# Patient Record
Sex: MALE | Race: WHITE | NOT HISPANIC OR LATINO | Employment: OTHER | URBAN - METROPOLITAN AREA
[De-identification: names, ages, dates, MRNs, and addresses within clinical notes are randomized per-mention and may not be internally consistent; named-entity substitution may affect disease eponyms.]

---

## 2019-08-12 ENCOUNTER — LAB (OUTPATIENT)
Dept: LAB | Facility: HOSPITAL | Age: 49
End: 2019-08-12

## 2019-08-12 ENCOUNTER — TRANSCRIBE ORDERS (OUTPATIENT)
Dept: ADMINISTRATIVE | Facility: HOSPITAL | Age: 49
End: 2019-08-12

## 2019-08-12 DIAGNOSIS — N42.9 DISORDER OF PROSTATE: ICD-10-CM

## 2019-08-12 DIAGNOSIS — I82.401 DVT, RECURRENT, LOWER EXTREMITY, ACUTE, RIGHT (HCC): Primary | ICD-10-CM

## 2019-08-12 DIAGNOSIS — I10 ESSENTIAL (PRIMARY) HYPERTENSION: ICD-10-CM

## 2019-08-12 LAB
APTT PPP: 42 SECONDS (ref 22.7–35.4)
CHROMATIN AB SERPL-ACNC: <10 IU/ML (ref 0–14)
ERYTHROCYTE [SEDIMENTATION RATE] IN BLOOD: 33 MM/HR (ref 0–15)
FACTOR II, DNA ANALYSIS: NORMAL
FIBRINOGEN PPP-MCNC: 457 MG/DL (ref 219–464)
HCYS SERPL-MCNC: 16.1 UMOL/L (ref 0–15)
INR PPP: 1.77 (ref 0.9–1.1)
PROTHROMBIN TIME: 20.3 SECONDS (ref 11.7–14.2)
PSA SERPL-MCNC: 0.56 NG/ML (ref 0–4)

## 2019-08-12 PROCEDURE — 85306 CLOT INHIBIT PROT S FREE: CPT

## 2019-08-12 PROCEDURE — 85705 THROMBOPLASTIN INHIBITION: CPT

## 2019-08-12 PROCEDURE — 86431 RHEUMATOID FACTOR QUANT: CPT

## 2019-08-12 PROCEDURE — 85652 RBC SED RATE AUTOMATED: CPT

## 2019-08-12 PROCEDURE — 83090 ASSAY OF HOMOCYSTEINE: CPT

## 2019-08-12 PROCEDURE — 85303 CLOT INHIBIT PROT C ACTIVITY: CPT

## 2019-08-12 PROCEDURE — 85732 THROMBOPLASTIN TIME PARTIAL: CPT

## 2019-08-12 PROCEDURE — 85613 RUSSELL VIPER VENOM DILUTED: CPT

## 2019-08-12 PROCEDURE — 36415 COLL VENOUS BLD VENIPUNCTURE: CPT | Performed by: NURSE PRACTITIONER

## 2019-08-12 PROCEDURE — 86038 ANTINUCLEAR ANTIBODIES: CPT

## 2019-08-12 PROCEDURE — 85307 ASSAY ACTIVATED PROTEIN C: CPT

## 2019-08-12 PROCEDURE — 85384 FIBRINOGEN ACTIVITY: CPT

## 2019-08-12 PROCEDURE — 81240 F2 GENE: CPT

## 2019-08-12 PROCEDURE — 85300 ANTITHROMBIN III ACTIVITY: CPT

## 2019-08-12 PROCEDURE — 86147 CARDIOLIPIN ANTIBODY EA IG: CPT

## 2019-08-12 PROCEDURE — 84153 ASSAY OF PSA TOTAL: CPT

## 2019-08-12 PROCEDURE — 85610 PROTHROMBIN TIME: CPT | Performed by: NURSE PRACTITIONER

## 2019-08-12 PROCEDURE — 85598 HEXAGNAL PHOSPH PLTLT NEUTRL: CPT

## 2019-08-12 PROCEDURE — 85670 THROMBIN TIME PLASMA: CPT

## 2019-08-12 PROCEDURE — 86148 ANTI-PHOSPHOLIPID ANTIBODY: CPT

## 2019-08-12 PROCEDURE — 85730 THROMBOPLASTIN TIME PARTIAL: CPT | Performed by: NURSE PRACTITIONER

## 2019-08-13 LAB
AT III PPP CHRO-ACNC: 99 % (ref 90–134)
PROT C ACT/NOR PPP: 27 % (ref 86–163)
PROT S ACT/NOR PPP: 70 % (ref 70–127)
PROT S FREE PPP-ACNC: 58 % (ref 49–138)

## 2019-08-14 ENCOUNTER — HOSPITAL ENCOUNTER (OUTPATIENT)
Dept: CT IMAGING | Facility: HOSPITAL | Age: 49
Discharge: HOME | End: 2019-08-14
Admitting: SURGERY

## 2019-08-14 DIAGNOSIS — I82.401 DVT, RECURRENT, LOWER EXTREMITY, ACUTE, RIGHT (HCC): ICD-10-CM

## 2019-08-14 LAB
ANA SER QL: NEGATIVE
APCR PPP: 2.5 RATIO (ref 2.2–3.5)
CARDIOLIPIN IGA SER IA-ACNC: <9 APL U/ML (ref 0–11)
CARDIOLIPIN IGG SER IA-ACNC: 10 GPL U/ML (ref 0–14)
CARDIOLIPIN IGM SER IA-ACNC: <9 MPL U/ML (ref 0–12)

## 2019-08-14 PROCEDURE — 82565 ASSAY OF CREATININE: CPT

## 2019-08-14 PROCEDURE — 74174 CTA ABD&PLVS W/CONTRAST: CPT

## 2019-08-14 PROCEDURE — 0 IOPAMIDOL PER 1 ML: Performed by: SURGERY

## 2019-08-14 RX ADMIN — IOPAMIDOL 95 ML: 755 INJECTION, SOLUTION INTRAVENOUS at 21:26

## 2019-08-15 LAB
APTT HEX PL PPP: 5 SEC (ref 0–11)
CREAT BLDA-MCNC: 1.2 MG/DL (ref 0.6–1.3)
DRVVT IMM 1:2 NP PPP: 38.6 SEC (ref 0–47)
INCUBATED PTT LA MIX: 50.2 SEC (ref 0–48.9)
LA NT DPL PPP: 67.9 SEC (ref 0–55)
LA NT DPL/LA NT HPL PPP-RTO: 0.94 RATIO (ref 0–1.4)
LA NT PLATELET PPP: 53.4 SEC (ref 0–51.9)
LUPUS ANTICOAGULANT REFLEX: ABNORMAL
PTT LA MIX: 46.7 SEC (ref 0–48.9)
SCREEN DRVVT: 51 SEC (ref 0–47)
THROMBIN TIME: 18 SEC (ref 0–23)

## 2019-08-16 ENCOUNTER — OFFICE VISIT (OUTPATIENT)
Dept: CARDIOLOGY | Facility: CLINIC | Age: 49
End: 2019-08-16

## 2019-08-16 VITALS
WEIGHT: 315 LBS | SYSTOLIC BLOOD PRESSURE: 130 MMHG | HEIGHT: 77 IN | BODY MASS INDEX: 37.19 KG/M2 | HEART RATE: 63 BPM | DIASTOLIC BLOOD PRESSURE: 80 MMHG

## 2019-08-16 DIAGNOSIS — R40.0 DAYTIME SOMNOLENCE: ICD-10-CM

## 2019-08-16 DIAGNOSIS — R06.09 EXERTIONAL DYSPNEA: ICD-10-CM

## 2019-08-16 DIAGNOSIS — R07.89 ATYPICAL CHEST PAIN: Primary | ICD-10-CM

## 2019-08-16 DIAGNOSIS — G47.10 HYPERSOMNIA: ICD-10-CM

## 2019-08-16 DIAGNOSIS — I34.1 MVP (MITRAL VALVE PROLAPSE): ICD-10-CM

## 2019-08-16 DIAGNOSIS — I10 ESSENTIAL HYPERTENSION: ICD-10-CM

## 2019-08-16 DIAGNOSIS — I89.0 LYMPHEDEMA: ICD-10-CM

## 2019-08-16 PROCEDURE — 93000 ELECTROCARDIOGRAM COMPLETE: CPT | Performed by: INTERNAL MEDICINE

## 2019-08-16 PROCEDURE — 99204 OFFICE O/P NEW MOD 45 MIN: CPT | Performed by: INTERNAL MEDICINE

## 2019-08-16 RX ORDER — WARFARIN SODIUM 5 MG/1
TABLET ORAL
COMMUNITY

## 2019-08-16 RX ORDER — CHLORAL HYDRATE 500 MG
CAPSULE ORAL
COMMUNITY

## 2019-08-16 RX ORDER — AMLODIPINE BESYLATE 5 MG/1
5 TABLET ORAL DAILY
COMMUNITY

## 2019-08-16 RX ORDER — LANOLIN ALCOHOL/MO/W.PET/CERES
50 CREAM (GRAM) TOPICAL DAILY
COMMUNITY

## 2019-08-16 RX ORDER — MULTIPLE VITAMINS W/ MINERALS TAB 9MG-400MCG
1 TAB ORAL DAILY
COMMUNITY

## 2019-08-16 NOTE — PROGRESS NOTES
[facility]      Patient Name: Josr Rayo Jr.  :1970  Age: 49 y.o.  Encounter Provider:  Jayro Tineo Jr, MD      Chief Complaint:   Chief Complaint   Patient presents with   • Chest Pain   • Hypertension         HPI  Josr Rayo Jr. is a 49 y.o. male past medical history of mitral valve prolapse diagnosed in childhood, severe bilateral lymphedema lower extremities, hypertension who presents for initial evaluation of chest pain.  The pain he describes wakes him from sleep it is sharp in nature and located in the anterior axillary line.  Denies any radiation of the pain and he has no pain with exertion although he is fairly sedentary due to the pain associated with his lower extremity swelling.  He is being evaluated by Dr. Caraballo for sclerotherapy of lower extremity varicosities.  He notes mild chronic exertional dyspnea.  He is being treated for hypertension currently on amlodipine.  He denies orthopnea or PND.  He notes significant snoring and daytime somnolence.  He denies any diagnosis of congenital disorder although he has mild features like frontal bossing which seem consistent with endocrine/metabolic disorder.  No reported family history of premature coronary artery disease.  He denies tobacco alcohol or illicit drug use.      The following portions of the patient's history were reviewed and updated as appropriate: allergies, current medications, past family history, past medical history, past social history, past surgical history and problem list.      Review of Systems   Constitution: Negative for chills and fever.   HENT: Negative for hoarse voice and sore throat.    Eyes: Negative for double vision and photophobia.   Cardiovascular: Positive for chest pain and leg swelling. Negative for near-syncope, orthopnea, palpitations, paroxysmal nocturnal dyspnea and syncope.   Respiratory: Negative for cough and wheezing.    Skin: Negative for poor wound healing and rash.  "  Musculoskeletal: Positive for arthritis. Negative for joint swelling.   Gastrointestinal: Negative for bloating, abdominal pain, hematemesis and hematochezia.   Neurological: Negative for dizziness and focal weakness.   Psychiatric/Behavioral: Negative for depression and suicidal ideas.       OBJECTIVE:   Vital Signs  Vitals:    08/16/19 0920   BP: 130/80   Pulse: 63     Estimated body mass index is 47.2 kg/m² as calculated from the following:    Height as of this encounter: 195.6 cm (77\").    Weight as of this encounter: 181 kg (398 lb).    Physical Exam   Constitutional: He is oriented to person, place, and time. He appears well-nourished. No distress.   HENT:   Head: Atraumatic.   Mild frontal bossing   Eyes: Conjunctivae are normal. Pupils are equal, round, and reactive to light.   Neck: No JVD present. No thyromegaly present.   Cardiovascular: Exam reveals no gallop and no friction rub.   No murmur heard.  Midsystolic click noted   Pulmonary/Chest: No respiratory distress. He exhibits no tenderness.   Abdominal: Bowel sounds are normal. He exhibits no distension.   Musculoskeletal: He exhibits edema. He exhibits no tenderness.   4+ edema to the knees bilaterally severe lymphedema   Neurological: He is alert and oriented to person, place, and time.   Skin: No rash noted. There is erythema.   Erythema of bilateral lower extremity with severe skin folds secondary to severe lymphedema   Psychiatric: He has a normal mood and affect. Judgment normal.   Vitals reviewed.        ECG 12 Lead  Date/Time: 8/16/2019 11:22 AM  Performed by: Jayro Tineo Jr., MD  Authorized by: Jayro Tineo Jr., MD   Comparison: not compared with previous ECG   Rhythm: sinus rhythm  Q waves: III and aVF    Other findings: non-specific ST-T wave changes    Clinical impression: abnormal EKG            Cardiac Procedures:  1.         ASSESSMENT:      Diagnosis Plan   1. Atypical chest pain     2. Exertional dyspnea     3. MVP (mitral valve " prolapse)     4. Essential hypertension     5. Lymphedema     6. Daytime somnolence           PLAN OF CARE:     1. Chest pain -atypical characteristics but in the setting of hypertension and abnormal EKG will require screening study.  Will order PET stress due to the patient's body habitus.  Chest pain may also be related to valvular anomaly.  Will check echocardiogram.  2. Mitral valve prolapse -check echocardiogram.  3. Hypertension -fair control but amlodipine may not be the best choice given this patient's severe lower extremity edema.  I have encouraged him to speak with Dr. Gilliland about the possibility of switching agents.  4. Daytimes somnolence -Home sleep study  5. Lymphedema -consideration being given to sclerotherapy.  Currently on warfarin for chronic DVT.  If stress test is negative then he is low risk for low risk procedure/surgery.    Return to clinic 3 months           Discharge Medications           Accurate as of 8/16/19 11:17 AM. If you have any questions, ask your nurse or doctor.               Continue These Medications      Instructions Start Date   AMINO ACID PO   Oral, As Needed      amLODIPine 5 MG tablet  Commonly known as:  NORVASC   5 mg, Oral, Daily      fish oil 1000 MG capsule capsule   Oral, Daily With Breakfast      FISH OIL PO   500 mg, Oral, Take one tablet by mouth twice daily       multivitamin with minerals tablet tablet   1 tablet, Oral, Daily      vitamin B-6 50 MG tablet  Commonly known as:  PYRIDOXINE   50 mg, Oral, Daily      warfarin 5 MG tablet  Commonly known as:  COUMADIN   Take one tablet daily alternating with 7.5 mg tablets              Thank you for allowing me to participate in the care of your patient,      Sincerely,   Jayro Tineo Jr, MD  [facility]  08/16/19  11:17 AM    **Jose Disclaimer:**  Much of this encounter note is an electronic transcription/translation of spoken language to printed text. The electronic translation of spoken language  may permit erroneous, or at times, nonsensical words or phrases to be inadvertently transcribed. Although I have reviewed the note for such errors, some may still exist.  \

## 2019-08-18 LAB
PS IGA SER-ACNC: 4 APS IGA (ref 0–20)
PS IGG SER-ACNC: 5 GPS IGG (ref 0–11)
PS IGM SER-ACNC: 5 MPS IGM (ref 0–25)

## 2019-08-30 ENCOUNTER — HOSPITAL ENCOUNTER (OUTPATIENT)
Dept: CARDIOLOGY | Facility: HOSPITAL | Age: 49
Discharge: HOME | End: 2019-08-30
Admitting: INTERNAL MEDICINE

## 2019-08-30 ENCOUNTER — HOSPITAL ENCOUNTER (OUTPATIENT)
Dept: CARDIOLOGY | Facility: HOSPITAL | Age: 49
Discharge: HOME | End: 2019-08-30

## 2019-08-30 VITALS
HEIGHT: 77 IN | OXYGEN SATURATION: 97 % | BODY MASS INDEX: 37.19 KG/M2 | DIASTOLIC BLOOD PRESSURE: 84 MMHG | SYSTOLIC BLOOD PRESSURE: 118 MMHG | HEART RATE: 93 BPM | WEIGHT: 315 LBS

## 2019-08-30 VITALS — WEIGHT: 315 LBS | HEIGHT: 77 IN | BODY MASS INDEX: 37.19 KG/M2

## 2019-08-30 DIAGNOSIS — R07.89 ATYPICAL CHEST PAIN: ICD-10-CM

## 2019-08-30 DIAGNOSIS — I34.1 MVP (MITRAL VALVE PROLAPSE): ICD-10-CM

## 2019-08-30 LAB
BH CV NUCLEAR PRIOR STUDY: 3
BH CV STRESS BP STAGE 1: NORMAL
BH CV STRESS COMMENTS STAGE 1: NORMAL
BH CV STRESS DOSE REGADENOSON STAGE 1: 0.4
BH CV STRESS DURATION MIN STAGE 1: 0
BH CV STRESS DURATION SEC STAGE 1: 10
BH CV STRESS HR STAGE 1: 97
BH CV STRESS PROTOCOL 1: NORMAL
BH CV STRESS RECOVERY BP: NORMAL MMHG
BH CV STRESS RECOVERY HR: 66 BPM
BH CV STRESS STAGE 1: 1
LV EF NUC BP: 65 %
MAXIMAL PREDICTED HEART RATE: 171 BPM
PERCENT MAX PREDICTED HR: 56.73 %
STRESS BASELINE BP: NORMAL MMHG
STRESS BASELINE HR: 58 BPM
STRESS PERCENT HR: 67 %
STRESS POST EXERCISE DUR SEC: 10 SEC
STRESS POST PEAK BP: NORMAL MMHG
STRESS POST PEAK HR: 97 BPM
STRESS TARGET HR: 145 BPM

## 2019-08-30 PROCEDURE — 0 RUBIDIUM CHLORIDE: Performed by: INTERNAL MEDICINE

## 2019-08-30 PROCEDURE — 93018 CV STRESS TEST I&R ONLY: CPT | Performed by: INTERNAL MEDICINE

## 2019-08-30 PROCEDURE — 93306 TTE W/DOPPLER COMPLETE: CPT

## 2019-08-30 PROCEDURE — 93017 CV STRESS TEST TRACING ONLY: CPT

## 2019-08-30 PROCEDURE — 93016 CV STRESS TEST SUPVJ ONLY: CPT | Performed by: INTERNAL MEDICINE

## 2019-08-30 PROCEDURE — 78492 MYOCRD IMG PET MLT RST&STRS: CPT | Performed by: INTERNAL MEDICINE

## 2019-08-30 PROCEDURE — 78492 MYOCRD IMG PET MLT RST&STRS: CPT

## 2019-08-30 PROCEDURE — A9555 RB82 RUBIDIUM: HCPCS | Performed by: INTERNAL MEDICINE

## 2019-08-30 PROCEDURE — 25010000002 PERFLUTREN (DEFINITY) 8.476 MG IN SODIUM CHLORIDE 0.9 % 10 ML INJECTION: Performed by: INTERNAL MEDICINE

## 2019-08-30 PROCEDURE — 93306 TTE W/DOPPLER COMPLETE: CPT | Performed by: INTERNAL MEDICINE

## 2019-08-30 PROCEDURE — 25010000002 REGADENOSON 0.4 MG/5ML SOLUTION: Performed by: INTERNAL MEDICINE

## 2019-08-30 RX ADMIN — PERFLUTREN 1.5 ML: 6.52 INJECTION, SUSPENSION INTRAVENOUS at 13:11

## 2019-08-30 RX ADMIN — REGADENOSON 0.4 MG: 0.08 INJECTION, SOLUTION INTRAVENOUS at 13:49

## 2019-08-30 RX ADMIN — RUBIDIUM CHLORIDE RB-82 1 DOSE: 150 INJECTION, SOLUTION INTRAVENOUS at 13:39

## 2019-08-30 RX ADMIN — RUBIDIUM CHLORIDE RB-82 1 DOSE: 150 INJECTION, SOLUTION INTRAVENOUS at 13:49

## 2019-09-03 ENCOUNTER — TELEPHONE (OUTPATIENT)
Dept: CARDIOLOGY | Facility: CLINIC | Age: 49
End: 2019-09-03

## 2019-09-03 LAB
AORTIC ARCH: 3 CM
AORTIC ROOT ANNULUS: 2.4 CM
ASCENDING AORTA: 3.9 CM
BH CV ECHO MEAS - ACS: 2.6 CM
BH CV ECHO MEAS - AO MAX PG (FULL): 3.4 MMHG
BH CV ECHO MEAS - AO MAX PG: 7.1 MMHG
BH CV ECHO MEAS - AO MEAN PG (FULL): 2 MMHG
BH CV ECHO MEAS - AO MEAN PG: 4.3 MMHG
BH CV ECHO MEAS - AO V2 MAX: 133 CM/SEC
BH CV ECHO MEAS - AO V2 MEAN: 98.7 CM/SEC
BH CV ECHO MEAS - AO V2 VTI: 30.6 CM
BH CV ECHO MEAS - AVA(I,A): 3.6 CM^2
BH CV ECHO MEAS - AVA(I,D): 3.6 CM^2
BH CV ECHO MEAS - AVA(V,A): 3.4 CM^2
BH CV ECHO MEAS - AVA(V,D): 3.4 CM^2
BH CV ECHO MEAS - BSA(HAYCOCK): 3.2 M^2
BH CV ECHO MEAS - BSA: 3 M^2
BH CV ECHO MEAS - BZI_BMI: 47.2 KILOGRAMS/M^2
BH CV ECHO MEAS - BZI_METRIC_HEIGHT: 195.6 CM
BH CV ECHO MEAS - BZI_METRIC_WEIGHT: 180.5 KG
BH CV ECHO MEAS - EDV(MOD-SP2): 172 ML
BH CV ECHO MEAS - EDV(MOD-SP4): 180 ML
BH CV ECHO MEAS - EDV(TEICH): 146.5 ML
BH CV ECHO MEAS - EF(CUBED): 66.1 %
BH CV ECHO MEAS - EF(MOD-BP): 59 %
BH CV ECHO MEAS - EF(MOD-SP2): 59.3 %
BH CV ECHO MEAS - EF(MOD-SP4): 58.3 %
BH CV ECHO MEAS - EF(TEICH): 57 %
BH CV ECHO MEAS - ESV(MOD-SP2): 70 ML
BH CV ECHO MEAS - ESV(MOD-SP4): 75 ML
BH CV ECHO MEAS - ESV(TEICH): 63 ML
BH CV ECHO MEAS - FS: 30.3 %
BH CV ECHO MEAS - IVS/LVPW: 0.93
BH CV ECHO MEAS - IVSD: 1.2 CM
BH CV ECHO MEAS - LAT PEAK E' VEL: 12 CM/SEC
BH CV ECHO MEAS - LV DIASTOLIC VOL/BSA (35-75): 60.1 ML/M^2
BH CV ECHO MEAS - LV MASS(C)D: 270.7 GRAMS
BH CV ECHO MEAS - LV MASS(C)DI: 90.3 GRAMS/M^2
BH CV ECHO MEAS - LV MAX PG: 3.7 MMHG
BH CV ECHO MEAS - LV MEAN PG: 2.4 MMHG
BH CV ECHO MEAS - LV SYSTOLIC VOL/BSA (12-30): 25 ML/M^2
BH CV ECHO MEAS - LV V1 MAX: 95.6 CM/SEC
BH CV ECHO MEAS - LV V1 MEAN: 74.4 CM/SEC
BH CV ECHO MEAS - LV V1 VTI: 23.6 CM
BH CV ECHO MEAS - LVIDD: 5.5 CM
BH CV ECHO MEAS - LVIDS: 3.8 CM
BH CV ECHO MEAS - LVLD AP2: 9.1 CM
BH CV ECHO MEAS - LVLD AP4: 9.4 CM
BH CV ECHO MEAS - LVLS AP2: 7.9 CM
BH CV ECHO MEAS - LVLS AP4: 7.7 CM
BH CV ECHO MEAS - LVOT AREA (M): 4.5 CM^2
BH CV ECHO MEAS - LVOT AREA: 4.7 CM^2
BH CV ECHO MEAS - LVOT DIAM: 2.4 CM
BH CV ECHO MEAS - LVPWD: 1.2 CM
BH CV ECHO MEAS - MED PEAK E' VEL: 7 CM/SEC
BH CV ECHO MEAS - MV A DUR: 0.17 SEC
BH CV ECHO MEAS - MV A MAX VEL: 64 CM/SEC
BH CV ECHO MEAS - MV DEC SLOPE: 260.9 CM/SEC^2
BH CV ECHO MEAS - MV DEC TIME: 0.3 SEC
BH CV ECHO MEAS - MV E MAX VEL: 77.6 CM/SEC
BH CV ECHO MEAS - MV E/A: 1.2
BH CV ECHO MEAS - MV MAX PG: 2.1 MMHG
BH CV ECHO MEAS - MV MEAN PG: 1.2 MMHG
BH CV ECHO MEAS - MV P1/2T MAX VEL: 79.1 CM/SEC
BH CV ECHO MEAS - MV P1/2T: 88.7 MSEC
BH CV ECHO MEAS - MV V2 MAX: 71.9 CM/SEC
BH CV ECHO MEAS - MV V2 MEAN: 52.7 CM/SEC
BH CV ECHO MEAS - MV V2 VTI: 30.8 CM
BH CV ECHO MEAS - MVA P1/2T LCG: 2.8 CM^2
BH CV ECHO MEAS - MVA(P1/2T): 2.5 CM^2
BH CV ECHO MEAS - MVA(VTI): 3.6 CM^2
BH CV ECHO MEAS - PA ACC TIME: 0.12 SEC
BH CV ECHO MEAS - PA MAX PG (FULL): 2 MMHG
BH CV ECHO MEAS - PA MAX PG: 4.5 MMHG
BH CV ECHO MEAS - PA PR(ACCEL): 25.1 MMHG
BH CV ECHO MEAS - PA V2 MAX: 105.9 CM/SEC
BH CV ECHO MEAS - PULM A REVS DUR: 0.13 SEC
BH CV ECHO MEAS - PULM A REVS VEL: 28.8 CM/SEC
BH CV ECHO MEAS - PULM DIAS VEL: 50.2 CM/SEC
BH CV ECHO MEAS - PULM S/D: 1
BH CV ECHO MEAS - PULM SYS VEL: 51 CM/SEC
BH CV ECHO MEAS - PVA(V,A): 4.3 CM^2
BH CV ECHO MEAS - PVA(V,D): 4.3 CM^2
BH CV ECHO MEAS - QP/QS: 0.94
BH CV ECHO MEAS - RAP SYSTOLE: 15 MMHG
BH CV ECHO MEAS - RV MAX PG: 2.5 MMHG
BH CV ECHO MEAS - RV MEAN PG: 1.5 MMHG
BH CV ECHO MEAS - RV V1 MAX: 78.6 CM/SEC
BH CV ECHO MEAS - RV V1 MEAN: 60.1 CM/SEC
BH CV ECHO MEAS - RV V1 VTI: 18 CM
BH CV ECHO MEAS - RVOT AREA: 5.8 CM^2
BH CV ECHO MEAS - RVOT DIAM: 2.7 CM
BH CV ECHO MEAS - RVSP: 38.1 MMHG
BH CV ECHO MEAS - SI(CUBED): 36.4 ML/M^2
BH CV ECHO MEAS - SI(LVOT): 36.8 ML/M^2
BH CV ECHO MEAS - SI(MOD-SP2): 34 ML/M^2
BH CV ECHO MEAS - SI(MOD-SP4): 35 ML/M^2
BH CV ECHO MEAS - SI(TEICH): 27.9 ML/M^2
BH CV ECHO MEAS - SUP REN AO DIAM: 2.6 CM
BH CV ECHO MEAS - SV(CUBED): 109 ML
BH CV ECHO MEAS - SV(LVOT): 110.4 ML
BH CV ECHO MEAS - SV(MOD-SP2): 102 ML
BH CV ECHO MEAS - SV(MOD-SP4): 105 ML
BH CV ECHO MEAS - SV(RVOT): 104.1 ML
BH CV ECHO MEAS - SV(TEICH): 83.5 ML
BH CV ECHO MEAS - TAPSE (>1.6): 3.3 CM2
BH CV ECHO MEAS - TR MAX VEL: 240.3 CM/SEC
BH CV ECHO MEASUREMENTS AVERAGE E/E' RATIO: 8.17
BH CV XLRA - RV BASE: 4.1 CM
BH CV XLRA - TDI S': 16 CM/SEC
LEFT ATRIUM VOLUME INDEX: 44 ML/M2
MAXIMAL PREDICTED HEART RATE: 171 BPM
SINUS: 3.7 CM
STJ: 3.2 CM
STRESS TARGET HR: 145 BPM

## 2019-09-03 NOTE — TELEPHONE ENCOUNTER
Patient called and stated that he missed your call and could not get the message that you left about his results of his echo and stress test.  Please advise.    CB: [phone]    Thanks,  Sarah

## 2019-09-06 NOTE — TELEPHONE ENCOUNTER
Please let him know that the stress test showed no evidence for low blood flow to the heart and that the echocardiogram showed normal squeezing function of the heart and no significant valvular disease.

## 2019-09-13 ENCOUNTER — HOSPITAL ENCOUNTER (OUTPATIENT)
Dept: SLEEP MEDICINE | Facility: HOSPITAL | Age: 49
End: 2019-09-13

## 2019-12-16 ENCOUNTER — OFFICE VISIT (OUTPATIENT)
Dept: CARDIOLOGY | Facility: CLINIC | Age: 49
End: 2019-12-16

## 2019-12-16 VITALS
RESPIRATION RATE: 16 BRPM | HEIGHT: 77 IN | WEIGHT: 315 LBS | DIASTOLIC BLOOD PRESSURE: 102 MMHG | OXYGEN SATURATION: 97 % | HEART RATE: 75 BPM | BODY MASS INDEX: 37.19 KG/M2 | SYSTOLIC BLOOD PRESSURE: 162 MMHG

## 2019-12-16 DIAGNOSIS — I10 ESSENTIAL HYPERTENSION: Primary | ICD-10-CM

## 2019-12-16 PROCEDURE — 99214 OFFICE O/P EST MOD 30 MIN: CPT | Performed by: INTERNAL MEDICINE

## 2019-12-16 NOTE — PROGRESS NOTES
[facility]      Patient Name: Josr Rayo Jr.  :1970  Age: 49 y.o.  Encounter Provider:  Jayro Tineo Jr, MD      Chief Complaint:   Chief Complaint   Patient presents with   • Hypertension     follow up         Hypertension   Associated symptoms include chest pain. Pertinent negatives include no orthopnea, palpitations or PND.     Josr Rayo Jr. is a 49 y.o. male past medical history of mitral valve prolapse diagnosed in childhood, severe bilateral lymphedema lower extremities, hypertension who presents for initial evaluation of chest pain.  The pain he describes wakes him from sleep it is sharp in nature and located in the anterior axillary line.  Denies any radiation of the pain and he has no pain with exertion although he is fairly sedentary due to the pain associated with his lower extremity swelling.  He is being evaluated by Dr. Raymundo for sclerotherapy of lower extremity varicosities.  He notes mild chronic exertional dyspnea.  He is being treated for hypertension currently on amlodipine.  He denies orthopnea or PND.  He notes significant snoring and daytime somnolence.  He denies any diagnosis of congenital disorder although he has mild features like frontal bossing which seem consistent with endocrine/metabolic disorder.  No reported family history of premature coronary artery disease.  He denies tobacco alcohol or illicit drug use.    Stress test performed after last visit showed no evidence of myocardial ischemia.  Echocardiogram showed normal left ventricular ejection fraction with only mild MR and no evidence of mitral valve prolapse.  He feels well and is increasing activity as tolerated.  Blood pressure slightly elevated today however they got caught in traffic and he notes that home monitoring shows much improved blood pressure control after last clinic visit.      The following portions of the patient's history were reviewed and updated as appropriate: allergies,  "current medications, past family history, past medical history, past social history, past surgical history and problem list.      Review of Systems   Constitution: Negative for chills and fever.   HENT: Negative for hoarse voice and sore throat.    Eyes: Negative for double vision and photophobia.   Cardiovascular: Positive for chest pain, claudication and leg swelling. Negative for near-syncope, orthopnea, palpitations, paroxysmal nocturnal dyspnea and syncope.   Respiratory: Negative for cough and wheezing.    Skin: Negative for poor wound healing and rash.   Musculoskeletal: Negative for arthritis and joint swelling.   Gastrointestinal: Negative for bloating, abdominal pain, hematemesis and hematochezia.   Neurological: Negative for dizziness and focal weakness.   Psychiatric/Behavioral: Positive for depression. Negative for suicidal ideas.   All other systems reviewed and are negative.      OBJECTIVE:   Vital Signs  Vitals:    12/16/19 1501   BP: (!) 162/102   Pulse: 75   Resp: 16   SpO2: 97%     Estimated body mass index is 46.96 kg/m² as calculated from the following:    Height as of this encounter: 195.6 cm (77\").    Weight as of this encounter: 180 kg (396 lb).    Physical Exam   Constitutional: He is oriented to person, place, and time. He appears well-nourished. No distress.   HENT:   Head: Atraumatic.   Mild frontal bossing   Eyes: Pupils are equal, round, and reactive to light. Conjunctivae are normal.   Neck: No JVD present. No thyromegaly present.   Cardiovascular: Exam reveals no gallop and no friction rub.   No murmur heard.  Midsystolic click noted   Pulmonary/Chest: No respiratory distress. He exhibits no tenderness.   Abdominal: Bowel sounds are normal. He exhibits no distension.   Musculoskeletal: He exhibits edema. He exhibits no tenderness.   4+ edema to the knees bilaterally severe lymphedema   Neurological: He is alert and oriented to person, place, and time.   Skin: No rash noted. There is " erythema.   Erythema of bilateral lower extremity with severe skin folds secondary to severe lymphedema   Psychiatric: He has a normal mood and affect. Judgment normal.   Vitals reviewed.              ASSESSMENT:      Diagnosis Plan   1. Essential hypertension           PLAN OF CARE:     1. Chest pain -stress negative for myocardial ischemia.  Resolved since last clinic visit.  2. Mitral valve prolapse -no overt echocardiographic evidence of mitral valve prolapse and only mild MR noted on echo with normal left ventricular ejection fraction.  3. Hypertension -fair control but amlodipine may not be the best choice given this patient's severe lower extremity edema.  I have encouraged him to speak with Dr. Gilliland about the possibility of switching agents.  4. Lymphedema -consideration being given to sclerotherapy.  Currently on warfarin for chronic DVT.  Given stress test is negative he is low risk for low risk procedure/surgery.    Return to clinic 6 months           Discharge Medications           Accurate as of December 16, 2019  3:02 PM. If you have any questions, ask your nurse or doctor.               Continue These Medications      Instructions Start Date   AMINO ACID PO   Oral, As Needed      amLODIPine 5 MG tablet  Commonly known as:  NORVASC   5 mg, Oral, Daily      fish oil 1000 MG capsule capsule   Oral, Daily With Breakfast      FISH OIL PO   500 mg, Oral, Take one tablet by mouth twice daily       multivitamin with minerals tablet tablet   1 tablet, Oral, Daily      vitamin B-6 50 MG tablet  Commonly known as:  PYRIDOXINE   50 mg, Oral, Daily      warfarin 5 MG tablet  Commonly known as:  COUMADIN   Take one tablet daily alternating with 7.5 mg tablets              Thank you for allowing me to participate in the care of your patient,      Sincerely,   Jayro Tineo MD  [facility]  12/16/19  3:02 PM    **Jose Disclaimer:**  Much of this encounter note is an electronic  transcription/translation of spoken language to printed text. The electronic translation of spoken language may permit erroneous, or at times, nonsensical words or phrases to be inadvertently transcribed. Although I have reviewed the note for such errors, some may still exist.  \